# Patient Record
Sex: FEMALE | Race: ASIAN | Employment: UNEMPLOYED | URBAN - METROPOLITAN AREA
[De-identification: names, ages, dates, MRNs, and addresses within clinical notes are randomized per-mention and may not be internally consistent; named-entity substitution may affect disease eponyms.]

---

## 2024-01-01 ENCOUNTER — APPOINTMENT (OUTPATIENT)
Dept: GENERAL RADIOLOGY | Facility: HOSPITAL | Age: 64
End: 2024-01-01
Attending: STUDENT IN AN ORGANIZED HEALTH CARE EDUCATION/TRAINING PROGRAM

## 2024-01-01 ENCOUNTER — HOSPITAL ENCOUNTER (EMERGENCY)
Facility: HOSPITAL | Age: 64
Discharge: HOME | End: 2024-01-01
Attending: STUDENT IN AN ORGANIZED HEALTH CARE EDUCATION/TRAINING PROGRAM

## 2024-01-01 VITALS
WEIGHT: 140 LBS | BODY MASS INDEX: 24.8 KG/M2 | HEIGHT: 63 IN | SYSTOLIC BLOOD PRESSURE: 131 MMHG | TEMPERATURE: 99 F | DIASTOLIC BLOOD PRESSURE: 68 MMHG | HEART RATE: 82 BPM | RESPIRATION RATE: 19 BRPM | OXYGEN SATURATION: 97 %

## 2024-01-01 DIAGNOSIS — L02.511 ABSCESS OF RIGHT HAND: Primary | ICD-10-CM

## 2024-01-01 LAB
ANION GAP SERPL CALC-SCNC: 3 MMOL/L (ref 0–18)
BASOPHILS # BLD AUTO: 0.02 X10(3) UL (ref 0–0.2)
BASOPHILS NFR BLD AUTO: 0.3 %
BUN BLD-MCNC: 18 MG/DL (ref 9–23)
BUN/CREAT SERPL: 23.1 (ref 10–20)
CALCIUM BLD-MCNC: 8.8 MG/DL (ref 8.7–10.4)
CHLORIDE SERPL-SCNC: 109 MMOL/L (ref 98–112)
CO2 SERPL-SCNC: 27 MMOL/L (ref 21–32)
CREAT BLD-MCNC: 0.78 MG/DL
DEPRECATED RDW RBC AUTO: 42.5 FL (ref 35.1–46.3)
EGFRCR SERPLBLD CKD-EPI 2021: 85 ML/MIN/1.73M2 (ref 60–?)
EOSINOPHIL # BLD AUTO: 0.08 X10(3) UL (ref 0–0.7)
EOSINOPHIL NFR BLD AUTO: 1 %
ERYTHROCYTE [DISTWIDTH] IN BLOOD BY AUTOMATED COUNT: 12.4 % (ref 11–15)
GLUCOSE BLD-MCNC: 101 MG/DL (ref 70–99)
HCT VFR BLD AUTO: 39.7 %
HGB BLD-MCNC: 12.8 G/DL
IMM GRANULOCYTES # BLD AUTO: 0.02 X10(3) UL (ref 0–1)
IMM GRANULOCYTES NFR BLD: 0.3 %
LYMPHOCYTES # BLD AUTO: 2.1 X10(3) UL (ref 1–4)
LYMPHOCYTES NFR BLD AUTO: 26.3 %
MCH RBC QN AUTO: 30.1 PG (ref 26–34)
MCHC RBC AUTO-ENTMCNC: 32.2 G/DL (ref 31–37)
MCV RBC AUTO: 93.4 FL
MONOCYTES # BLD AUTO: 0.5 X10(3) UL (ref 0.1–1)
MONOCYTES NFR BLD AUTO: 6.3 %
NEUTROPHILS # BLD AUTO: 5.26 X10 (3) UL (ref 1.5–7.7)
NEUTROPHILS # BLD AUTO: 5.26 X10(3) UL (ref 1.5–7.7)
NEUTROPHILS NFR BLD AUTO: 65.8 %
OSMOLALITY SERPL CALC.SUM OF ELEC: 290 MOSM/KG (ref 275–295)
PLATELET # BLD AUTO: 202 10(3)UL (ref 150–450)
POTASSIUM SERPL-SCNC: 3.9 MMOL/L (ref 3.5–5.1)
RBC # BLD AUTO: 4.25 X10(6)UL
SODIUM SERPL-SCNC: 139 MMOL/L (ref 136–145)
WBC # BLD AUTO: 8 X10(3) UL (ref 4–11)

## 2024-01-01 PROCEDURE — 73130 X-RAY EXAM OF HAND: CPT | Performed by: STUDENT IN AN ORGANIZED HEALTH CARE EDUCATION/TRAINING PROGRAM

## 2024-01-01 PROCEDURE — 99284 EMERGENCY DEPT VISIT MOD MDM: CPT

## 2024-01-01 PROCEDURE — 99285 EMERGENCY DEPT VISIT HI MDM: CPT

## 2024-01-01 PROCEDURE — 85025 COMPLETE CBC W/AUTO DIFF WBC: CPT | Performed by: STUDENT IN AN ORGANIZED HEALTH CARE EDUCATION/TRAINING PROGRAM

## 2024-01-01 PROCEDURE — 96365 THER/PROPH/DIAG IV INF INIT: CPT

## 2024-01-01 PROCEDURE — 80048 BASIC METABOLIC PNL TOTAL CA: CPT | Performed by: STUDENT IN AN ORGANIZED HEALTH CARE EDUCATION/TRAINING PROGRAM

## 2024-01-01 RX ORDER — METOPROLOL SUCCINATE 25 MG/1
25 TABLET, EXTENDED RELEASE ORAL DAILY
COMMUNITY

## 2024-01-01 RX ORDER — AMOXICILLIN AND CLAVULANATE POTASSIUM 875; 125 MG/1; MG/1
1 TABLET, FILM COATED ORAL 2 TIMES DAILY
Qty: 20 TABLET | Refills: 0 | Status: SHIPPED | OUTPATIENT
Start: 2024-01-01 | End: 2024-01-11

## 2024-01-01 RX ORDER — AMOXICILLIN AND CLAVULANATE POTASSIUM 875; 125 MG/1; MG/1
1 TABLET, FILM COATED ORAL 2 TIMES DAILY
Qty: 20 TABLET | Refills: 0 | Status: SHIPPED | OUTPATIENT
Start: 2024-01-01 | End: 2024-01-01 | Stop reason: RX

## 2024-01-01 RX ORDER — OMEPRAZOLE 20 MG/1
20 CAPSULE, DELAYED RELEASE ORAL
COMMUNITY

## 2024-01-01 RX ORDER — LOSARTAN POTASSIUM 50 MG/1
50 TABLET ORAL DAILY
COMMUNITY

## 2024-01-01 RX ORDER — HYDROCODONE BITARTRATE AND ACETAMINOPHEN 5; 325 MG/1; MG/1
1 TABLET ORAL ONCE
Status: COMPLETED | OUTPATIENT
Start: 2024-01-01 | End: 2024-01-01

## 2024-01-01 RX ORDER — ATORVASTATIN CALCIUM 20 MG/1
40 TABLET, FILM COATED ORAL NIGHTLY
COMMUNITY

## 2024-01-01 NOTE — ED INITIAL ASSESSMENT (HPI)
Pt arrives ambulatory to ED for c/o abscess to R hand x3 days. Pt states today it broke open today and yellow drainage came out. PT rates pain 10/10. Pt now also has circumferential swelling to R hand. Aox4, speaking in full sentences.

## 2024-01-02 ENCOUNTER — HOSPITAL ENCOUNTER (EMERGENCY)
Facility: HOSPITAL | Age: 64
Discharge: HOME | End: 2024-01-02
Attending: EMERGENCY MEDICINE

## 2024-01-02 VITALS
DIASTOLIC BLOOD PRESSURE: 67 MMHG | TEMPERATURE: 98 F | SYSTOLIC BLOOD PRESSURE: 132 MMHG | HEART RATE: 81 BPM | RESPIRATION RATE: 18 BRPM | OXYGEN SATURATION: 98 %

## 2024-01-02 DIAGNOSIS — L02.519 HAND ABSCESS: Primary | ICD-10-CM

## 2024-01-02 PROCEDURE — 99284 EMERGENCY DEPT VISIT MOD MDM: CPT

## 2024-01-02 PROCEDURE — 99283 EMERGENCY DEPT VISIT LOW MDM: CPT

## 2024-01-02 RX ORDER — CEFADROXIL 500 MG/1
500 CAPSULE ORAL 2 TIMES DAILY
Qty: 14 CAPSULE | Refills: 0 | Status: SHIPPED | OUTPATIENT
Start: 2024-01-02 | End: 2024-01-09

## 2024-01-02 NOTE — DISCHARGE INSTRUCTIONS
Follow-up with a hand surgeon tomorrow at 9 AM.  Please start the antibiotics twice a day x 10 days.  Return for fevers, worsening pain or swelling.

## 2024-01-02 NOTE — ED PROVIDER NOTES
[place] Emergency Department Note  Patient: Moraima Dhillon Age: 63 year old Sex: female      MRN: [id number]  : 3/2/1960    Patient Seen in: [facility] Emergency Department    History     Chief Complaint   Patient presents with    Abscess    Swelling     Stated Complaint: abscess    History obtained from: Patient    Patient is a 63-year-old female with past medical history of hypertension presenting today for evaluation of right hand abscess.  She states that approximately 3 days ago, she noticed a large fluid pocket on the palmar aspect of her right hand.  She states that yesterday, the abscess \"popped\" and drained yellow purulent fluid.  She states that today, she developed worsening pain and swelling to the right hand including to the dorsal aspect of the right hand.  Patient is right-hand dominant.  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  She denies any associated fevers.  Denies any wrist or forearm pain.  States that she has pain in the fourth and fifth digit with movement but is otherwise able to move the digits without difficulty.  Noted swelling noted by patient.    Review of Systems:  Review of Systems  Positive for stated complaint: abscess. Constitutional and vital signs reviewed. All other systems reviewed and negative except as noted above.    Patient History:  History reviewed. No pertinent past medical history.    History reviewed. No pertinent surgical history.     No family history on file.    Specific Social Determinants of Health:   Social History     Socioeconomic History    Marital status:    Tobacco Use    Smoking status: Never    Smokeless tobacco: Never           PSFH elements reviewed from today and agreed except as otherwise stated in HPI.    Physical Exam     ED Triage Vitals [24 1743]   /75   Pulse 91   Resp 16   Temp 98.6 °F (37 °C)   Temp src Temporal   SpO2 100 %   O2 Device None (Room air)       Current:/68   Pulse 82    Temp 98.6 °F (37 °C) (Temporal)   Resp 19   Ht 160 cm (5' 3\")   Wt 63.5 kg   SpO2 97%   BMI 24.80 kg/m²         Physical Exam  Constitutional:       General: She is not in acute distress.  HENT:      Head: Normocephalic and atraumatic.      Mouth/Throat:      Mouth: Mucous membranes are moist.   Eyes:      Extraocular Movements: Extraocular movements intact.   Cardiovascular:      Rate and Rhythm: Normal rate and regular rhythm.      Heart sounds: Normal heart sounds.   Pulmonary:      Effort: Pulmonary effort is normal. No respiratory distress.      Breath sounds: Normal breath sounds.   Abdominal:      Palpations: Abdomen is soft.      Tenderness: There is no abdominal tenderness.   Musculoskeletal:      Comments: Approximately 1.5 by 1.5 cm abscess at the base of the fourth and fifth digit of the right hand on the palmar aspect draining small amount of purulent fluid with surrounding erythema and edema extending dorsally over the MCP joint of the right hand, no edema or tenderness over the digits including digits 4 and 5.  Radial pulse equal   Skin:     General: Skin is warm and dry.      Capillary Refill: Capillary refill takes less than 2 seconds.      Findings: No rash.   Neurological:      General: No focal deficit present.      Mental Status: She is alert and oriented to person, place, and time.      Sensory: No sensory deficit.   Psychiatric:         Mood and Affect: Mood normal.         Behavior: Behavior normal.               ED Course   Labs:   Labs Reviewed   BASIC METABOLIC PANEL (8) - Abnormal; Notable for the following components:       Result Value    Glucose 101 (*)     BUN/CREA Ratio 23.1 (*)     All other components within normal limits   CBC WITH DIFFERENTIAL WITH PLATELET    Narrative:     The following orders were created for panel order CBC With Differential With Platelet.  Procedure                               Abnormality         Status                     ---------                                -----------         ------                     CBC W/ DIFFERENTIAL[[id number]]                              Final result                 Please view results for these tests on the individual orders.   CBC W/ DIFFERENTIAL     Radiology findings:  I personally reviewed the images.   XR HAND (MIN 3 VIEWS), RIGHT (CPT=73130)    Result Date: 1/1/2024  CONCLUSION:   No acute fracture or dislocation.  No soft tissue emphysema.  No radiopaque foreign body.    Dictated by (CST): Marlon Lopez MD on 1/01/2024 at 9:40 PM     Finalized by (CST): Marlon Lopez MD on 1/01/2024 at 9:41 PM             Cardiac Monitor: Interpreted by me.   Pulse Readings from Last 1 Encounters:   01/01/24 82   , sinus,     External non-ED records reviewed independently by me: Outpatient records reviewed with baseline GFR of 67.4 obtained on 7/14/2021.    MDM   63-year-old female with past medical history of hypertension presenting today for evaluation of right hand abscess with drainage of purulent fluid.  No fevers or systemic symptoms of infection.  Exam as above    Differential diagnoses considered includes, but is not limited to: Hand abscess, cellulitis    Will obtain the following tests: X-ray right hand, CBC, BMP  Please see ED course for my independent review of these tests/imaging results.    Initial Medications/Therapeutics administered: Unasyn, Norco    Chronic conditions affecting care: Hypertension    Workup and medications considered but not ordered: Considered admission for operative washout    Social Determinants of Health that impacted care: None    ED Course as of 01/02/24 0202  ------------------------------------------------------------  Time: 01/01 2130  Comment: Labs overall reassuring.  I independently reviewed the x-ray that shows no evidence of subcutaneous air.  I discussed patient's case with Dr. Jordan, hand surgery, who recommended 1 dose of Unasyn to be given in the emergency department and discharged home  on augmentin.  Patient to follow up with Julian at 9a at the [place] office.  Discussed plan of care with patient who expressed understanding and agreement.  Stable for discharge home at this time.  Return precautions were provided and all questions answered.  Patient expressed understanding and agreement with this plan.        Disposition and Plan     Clinical Impression:  1. Abscess of right hand        Disposition:  Discharge    Follow-up:  Jason Jordan MD  [address] [facility] [address]  [facility] [address] [phone]    Go in 1 day(s)  go to the office tomorrow 1/2/24 at 9am for reevaluation by the hand surgeon      Medications Prescribed:  Discharge Medication List as of 1/1/2024  9:32 PM        START taking these medications    Details   amoxicillin clavulanate 875-125 MG Oral Tab Take 1 tablet by mouth 2 (two) times daily for 10 days., Normal, Disp-20 tablet, R-0               This note may have been created using voice dictation technology and may include inadvertent errors.      Juany Holden MD  Emergency Medicine

## 2024-01-03 ENCOUNTER — LAB REQUISITION (OUTPATIENT)
Dept: LAB | Facility: HOSPITAL | Age: 64
End: 2024-01-03

## 2024-01-03 DIAGNOSIS — L02.511 CUTANEOUS ABSCESS OF RIGHT HAND: ICD-10-CM

## 2024-01-03 PROCEDURE — 87186 SC STD MICRODIL/AGAR DIL: CPT | Performed by: PLASTIC SURGERY

## 2024-01-03 PROCEDURE — 87077 CULTURE AEROBIC IDENTIFY: CPT | Performed by: PLASTIC SURGERY

## 2024-01-03 PROCEDURE — 87070 CULTURE OTHR SPECIMN AEROBIC: CPT | Performed by: PLASTIC SURGERY

## 2024-01-03 PROCEDURE — 87147 CULTURE TYPE IMMUNOLOGIC: CPT | Performed by: PLASTIC SURGERY

## 2024-01-03 PROCEDURE — 87205 SMEAR GRAM STAIN: CPT | Performed by: PLASTIC SURGERY

## 2024-01-03 PROCEDURE — 87075 CULTR BACTERIA EXCEPT BLOOD: CPT | Performed by: PLASTIC SURGERY

## 2024-01-03 NOTE — ED QUICK NOTES
Patient safe to discharge home per ED Provider. Discharge education provided, including follow up instructions. Patient verbalizes understanding. Family at bedside.

## 2024-01-03 NOTE — ED PROVIDER NOTES
Patient Seen in: [facility] Emergency Department      History     Chief Complaint   Patient presents with    Abscess     Stated Complaint: hand wound issue, hand surgeon told her to come to ED    Subjective:   HPI    Patient is a 63-year-old female who was seen here yesterday for an abscess that developed on her hand.  She had a follow-up appointment today with hand surgery.  She was discharged yesterday on Augmentin.  She states when she took the Augmentin this morning her stomach became upset and she vomited she called the hand surgeon was told to go back to the ER.  She states her hand is much better the swelling on the dorsum is improved by 75%.    Objective:   History reviewed. No pertinent past medical history.           History reviewed. No pertinent surgical history.             Social History     Socioeconomic History    Marital status:    Tobacco Use    Smoking status: Never    Smokeless tobacco: Never   Substance and Sexual Activity    Alcohol use: Never    Drug use: Never              Review of Systems    Positive for stated complaint: hand wound issue, hand surgeon told her to come to ED  Other systems are as noted in HPI.  Constitutional and vital signs reviewed.      All other systems reviewed and negative except as noted above.    Physical Exam     ED Triage Vitals [01/02/24 1830]   /67   Pulse 88   Resp 18   Temp 97.5 °F (36.4 °C)   Temp src Temporal   SpO2 100 %   O2 Device None (Room air)       Current:/67   Pulse 88   Temp 97.5 °F (36.4 °C) (Temporal)   Resp 18   SpO2 100%         Physical Exam    Patient awake alert no distress for exam in her hand    There is an open draining wound on the palmar surface at the base of the fourth and fifth fingers.  There is minimal drainage.  There is minimal erythema on the dorsum of the hand with significant less swelling than the photos from yesterday.    ED Course   Labs Reviewed - No data to display                   MDM       Use of independent historian: Patient's daughter helps with history.    I personally reviewed and interpreted the images : X-rays from yesterday reviewed no fracture    XR HAND (MIN 3 VIEWS), RIGHT (CPT=73130)    Result Date: 1/1/2024  CONCLUSION:   No acute fracture or dislocation.  No soft tissue emphysema.  No radiopaque foreign body.    Dictated by (CST): Marlon Lopez MD on 1/01/2024 at 9:40 PM     Finalized by (CST): Marlon Lopez MD on 1/01/2024 at 9:41 PM           Vitals:    01/02/24 1830   BP: 132/67   Pulse: 88   Resp: 18   Temp: 97.5 °F (36.4 °C)   TempSrc: Temporal   SpO2: 100%     *I personally reviewed and interpreted all ED vitals.    Pulse Ox: 100%, Room air, Normal         Differential Diagnosis/ Diagnostic Considerations: Abscess to hand that seems to be improving.  Patient did have episode of vomiting after Augmentin likely side effect of antibiotic.  No fevers.  Will change to Duricef    Medical Record Review: I personally reviewed available prior medical records for any recent pertinent discharge summaries, testing, and procedures and reviewed those reports and found notes and photos from yesterday reviewed.    Complicating Factors: The patient already has  which contribute to the complexity of this ED evaluation.    Social determinants of health:    Prescription drug management:      Shared Decision Making:    ED Course: Patient and daughter agree with plan to change antibiotics to Duricef and follow-up with hand surgery the next day or 2.    Discussion of management with other healthcare providers:    Condition upon leaving the department: Stable                                     Medical Decision Making      Disposition and Plan     Clinical Impression:  1. Hand abscess         Disposition:  Discharge  1/2/2024  7:14 pm    Follow-up:  Jason Jordan MD  [address]  [facility] [address] [phone]    Follow up in 1 day(s)            Medications Prescribed:  Current  Discharge Medication List        START taking these medications    Details   cefadroxil 500 MG Oral Cap Take 1 capsule (500 mg total) by mouth 2 (two) times daily for 7 days.  Qty: 14 capsule, Refills: 0

## 2024-01-03 NOTE — ED INITIAL ASSESSMENT (HPI)
Pt presents to ed with c/o right hand abscess.  Pt states she vomited today and the hand surgeon told her to come in to be assessed    Pt states she was here last night for the same thing.     Denies worsening pain.

## 2024-08-09 ENCOUNTER — HOSPITAL ENCOUNTER (EMERGENCY)
Facility: HOSPITAL | Age: 64
Discharge: HOME | End: 2024-08-09
Attending: EMERGENCY MEDICINE

## 2024-08-09 VITALS
HEART RATE: 88 BPM | DIASTOLIC BLOOD PRESSURE: 70 MMHG | WEIGHT: 130 LBS | BODY MASS INDEX: 23.04 KG/M2 | TEMPERATURE: 98 F | HEIGHT: 63 IN | OXYGEN SATURATION: 99 % | RESPIRATION RATE: 16 BRPM | SYSTOLIC BLOOD PRESSURE: 126 MMHG

## 2024-08-09 DIAGNOSIS — U07.1 COVID-19: Primary | ICD-10-CM

## 2024-08-09 LAB
FLUAV + FLUBV RNA SPEC NAA+PROBE: NEGATIVE
FLUAV + FLUBV RNA SPEC NAA+PROBE: NEGATIVE
RSV RNA SPEC NAA+PROBE: NEGATIVE
SARS-COV-2 RNA RESP QL NAA+PROBE: DETECTED

## 2024-08-09 PROCEDURE — 99283 EMERGENCY DEPT VISIT LOW MDM: CPT

## 2024-08-09 PROCEDURE — 0241U SARS-COV-2/FLU A AND B/RSV BY PCR (GENEXPERT): CPT | Performed by: EMERGENCY MEDICINE

## 2024-08-09 RX ORDER — ACETAMINOPHEN 500 MG
1000 TABLET ORAL ONCE
Status: COMPLETED | OUTPATIENT
Start: 2024-08-09 | End: 2024-08-09

## 2024-08-10 NOTE — ED PROVIDER NOTES
Patient Seen in: [facility] Emergency Department      History     Chief Complaint   Patient presents with    Abdominal Pain    Body ache and/or chills     Stated Complaint: chills/fever, abd pain    Subjective:   64-year-old female with history of hypertension and hyperlipidemia here with 2-1/2 hours of chills and scratchy throat and some fatigue.  She feels like the scratchy throat makes her want to cough but she does not have a significant cough.  She denies any pain to me.  No nausea vomiting or diarrhea.  I asked her a second time if she has any pain and she said no because the triage note mentions some abdominal pain.  She denies any urinary symptoms to me.  No rash.  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  She is not sure if they were tested for COVID.  Patient has not taken any medications at this point.            Objective:   No pertinent past medical history.            No pertinent past surgical history.              No pertinent social history.            Review of Systems    Positive for stated Chief Complaint: Abdominal Pain and Body ache and/or chills    Other systems are as noted in HPI.  Constitutional and vital signs reviewed.      All other systems reviewed and negative except as noted above.    Physical Exam     ED Triage Vitals [08/09/24 1907]   /71   Pulse 84   Resp 18   Temp 98.3 °F (36.8 °C)   Temp src Temporal   SpO2 99 %   O2 Device None (Room air)       Current Vitals:   Vital Signs  BP: 126/70  Pulse: 88  Resp: 16  Temp: 98.3 °F (36.8 °C)  Temp src: Temporal    Oxygen Therapy  SpO2: 99 %  O2 Device: None (Room air)            Physical Exam  Constitutional:       Comments: Patient warm to the touch   HENT:      Head: Normocephalic.      Nose: Rhinorrhea present.      Mouth/Throat:      Pharynx: Posterior oropharyngeal erythema present. No oropharyngeal exudate.   Eyes:      Extraocular Movements: Extraocular movements intact.       Pupils: Pupils are equal, round, and reactive to light.   Cardiovascular:      Rate and Rhythm: Normal rate and regular rhythm.   Pulmonary:      Effort: Pulmonary effort is normal.      Breath sounds: Normal breath sounds.   Abdominal:      Palpations: Abdomen is soft.      Tenderness: There is no abdominal tenderness.   Musculoskeletal:         General: No swelling or tenderness. Normal range of motion.   Skin:     General: Skin is warm.      Capillary Refill: Capillary refill takes less than 2 seconds.   Neurological:      General: No focal deficit present.      Mental Status: She is alert.               ED Course     Labs Reviewed   SARS-COV-2/FLU A AND B/RSV BY PCR (GENEXPERT) - Abnormal; Notable for the following components:       Result Value    SARS-CoV-2 (COVID-19) - (GeneXpert) Detected (*)     All other components within normal limits    Narrative:     This test is intended for the qualitative detection and differentiation of SARS-CoV-2, influenza A, influenza B, and respiratory syncytial virus (RSV) viral RNA in nasopharyngeal or nares swabs from individuals suspected of respiratory viral infection consistent with COVID-19 by their healthcare provider. Signs and symptoms of respiratory viral infection due to SARS-CoV-2, influenza, and RSV can be similar.    Test performed using the Xpert Xpress SARS-CoV-2/FLU/RSV (real time RT-PCR)  assay on the GeneXpert instrument, [place], [place] [address].   This test is being used under the Food and Drug Administration's Emergency Use Authorization.    The authorized Fact Sheet for Healthcare Providers for this assay is available upon request from the laboratory.                      MDM                                         Medical Decision Making  Patient with 2-1/2 hours of chills and runny nose and scratchy throat and slight cough.  Differential is vast could include viral illnesses, bacterial illnesses, pneumonia.  Her lungs are clear and her pulse ox  is normal at 99%.  I disagree with the triage note.  She has no abdominal pain in the room and no tenderness in the room.  Does not seem to be consistent with a intra-abdominal process.  For now we will give oral hydration Tylenol and do a GEN expert.    Amount and/or Complexity of Data Reviewed  Labs: ordered. Decision-making details documented in ED Course.  Discussion of management or test interpretation with external provider(s): COVID test positive.  Patient improved with Tylenol.  Appeared nontoxic.  Had normal pulse ox.  Understood instructions will return for changes worsening.  No antibiotics indicated.    Risk  OTC drugs.        Disposition and Plan     Clinical Impression:  1. COVID-19         Disposition:  Discharge  8/9/2024  8:17 pm    Follow-up:  Fidel Arora MD  [address] [place] [id number]  [phone]    Follow up            Medications Prescribed:  Discharge Medication List as of 8/9/2024  8:19 PM

## 2024-08-10 NOTE — DISCHARGE INSTRUCTIONS
Drink plenty of fluids and eat plenty of food.  Ibuprofen 600 mg every 8 hours as needed for fever.  Read all instructions for further recommendations.  Return to the ER for changes or worsening.

## 2024-08-10 NOTE — ED INITIAL ASSESSMENT (HPI)
Pt with Hx of HTN and HL to ED with c/o LLQ pain, chills, and fatigue x1 day. Pt denies fever or n/v/d.  Pt denies changes to urinary patterns.